# Patient Record
Sex: FEMALE | Race: WHITE | Employment: OTHER | URBAN - METROPOLITAN AREA
[De-identification: names, ages, dates, MRNs, and addresses within clinical notes are randomized per-mention and may not be internally consistent; named-entity substitution may affect disease eponyms.]

---

## 2022-05-08 ENCOUNTER — HOSPITAL ENCOUNTER (EMERGENCY)
Age: 72
Discharge: HOME | End: 2022-05-08
Payer: MEDICARE

## 2022-05-08 ENCOUNTER — APPOINTMENT (OUTPATIENT)
Dept: GENERAL RADIOLOGY | Age: 72
End: 2022-05-08
Payer: MEDICARE

## 2022-05-08 ENCOUNTER — APPOINTMENT (OUTPATIENT)
Dept: CT IMAGING | Age: 72
End: 2022-05-08
Payer: MEDICARE

## 2022-05-08 VITALS
RESPIRATION RATE: 14 BRPM | DIASTOLIC BLOOD PRESSURE: 84 MMHG | HEART RATE: 76 BPM | WEIGHT: 160 LBS | BODY MASS INDEX: 31.41 KG/M2 | HEIGHT: 60 IN | SYSTOLIC BLOOD PRESSURE: 158 MMHG | TEMPERATURE: 97 F | OXYGEN SATURATION: 96 %

## 2022-05-08 DIAGNOSIS — S02.2XXA CLOSED FRACTURE OF NASAL BONE, INITIAL ENCOUNTER: Primary | ICD-10-CM

## 2022-05-08 DIAGNOSIS — S63.502A SPRAIN OF LEFT WRIST, INITIAL ENCOUNTER: ICD-10-CM
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
## 2022-05-08 DIAGNOSIS — S09.90XA INJURY OF HEAD, INITIAL ENCOUNTER: ICD-10-CM

## 2022-05-08 PROCEDURE — 6370000000 HC RX 637 (ALT 250 FOR IP)

## 2022-05-08 PROCEDURE — 90471 IMMUNIZATION ADMIN: CPT

## 2022-05-08 PROCEDURE — 70450 CT HEAD/BRAIN W/O DYE: CPT

## 2022-05-08 PROCEDURE — 70486 CT MAXILLOFACIAL W/O DYE: CPT

## 2022-05-08 PROCEDURE — 72125 CT NECK SPINE W/O DYE: CPT

## 2022-05-08 PROCEDURE — 6360000002 HC RX W HCPCS

## 2022-05-08 PROCEDURE — 73130 X-RAY EXAM OF HAND: CPT

## 2022-05-08 PROCEDURE — 99284 EMERGENCY DEPT VISIT MOD MDM: CPT

## 2022-05-08 PROCEDURE — 90714 TD VACC NO PRESV 7 YRS+ IM: CPT

## 2022-05-08 PROCEDURE — 73110 X-RAY EXAM OF WRIST: CPT

## 2022-05-08 RX ORDER — TETANUS AND DIPHTHERIA TOXOIDS ADSORBED 2; 2 [LF]/.5ML; [LF]/.5ML
0.5 INJECTION INTRAMUSCULAR ONCE
Status: COMPLETED | OUTPATIENT
Start: 2022-05-08 | End: 2022-05-08

## 2022-05-08 RX ORDER — ACETAMINOPHEN 500 MG
1000 TABLET ORAL ONCE
Status: COMPLETED | OUTPATIENT
Start: 2022-05-08 | End: 2022-05-08

## 2022-05-08 RX ADMIN — ACETAMINOPHEN 1000 MG: 500 TABLET ORAL at 11:21

## 2022-05-08 RX ADMIN — TETANUS AND DIPHTHERIA TOXOIDS ADSORBED 0.5 ML: 2; 2 INJECTION INTRAMUSCULAR at 11:30

## 2022-05-08 ASSESSMENT — VISUAL ACUITY: OU: 1

## 2022-05-08 ASSESSMENT — PAIN - FUNCTIONAL ASSESSMENT: PAIN_FUNCTIONAL_ASSESSMENT: 0-10

## 2022-05-08 ASSESSMENT — PAIN DESCRIPTION - DESCRIPTORS
DESCRIPTORS: DISCOMFORT
DESCRIPTORS: DISCOMFORT;SHARP

## 2022-05-08 ASSESSMENT — PAIN DESCRIPTION - LOCATION
LOCATION: HEAD
LOCATION: FACE

## 2022-05-08 ASSESSMENT — PAIN SCALES - GENERAL
PAINLEVEL_OUTOF10: 8
PAINLEVEL_OUTOF10: 7

## 2022-05-08 ASSESSMENT — PAIN DESCRIPTION - ORIENTATION: ORIENTATION: POSTERIOR
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
## 2023-05-17 DIAGNOSIS — I10 ESSENTIAL (PRIMARY) HYPERTENSION: ICD-10-CM

## 2023-05-18 RX ORDER — AMLODIPINE BESYLATE 2.5 MG/1
TABLET ORAL
Qty: 90 TABLET | Refills: 1 | Status: SHIPPED | OUTPATIENT
Start: 2023-05-18 | End: 2023-10-17 | Stop reason: ALTCHOICE

## 2023-08-29 PROBLEM — M35.00 SJOGRENS SYNDROME (MULTI): Status: ACTIVE | Noted: 2023-08-29

## 2023-08-29 PROBLEM — I10 ESSENTIAL HYPERTENSION: Status: ACTIVE | Noted: 2023-08-29

## 2023-08-29 PROBLEM — R20.0 FACIAL NUMBNESS: Status: ACTIVE | Noted: 2023-08-29

## 2023-08-29 PROBLEM — S02.2XXA NASAL FRACTURE: Status: ACTIVE | Noted: 2023-08-29

## 2023-08-29 PROBLEM — E78.49 OTHER HYPERLIPIDEMIA: Status: ACTIVE | Noted: 2023-08-29

## 2023-08-29 PROBLEM — F41.8 DEPRESSION WITH ANXIETY: Status: ACTIVE | Noted: 2023-08-29

## 2023-08-29 PROBLEM — S09.92XA NASAL TRAUMA: Status: ACTIVE | Noted: 2023-08-29

## 2023-08-29 PROBLEM — K59.09 CHRONIC CONSTIPATION: Status: ACTIVE | Noted: 2023-08-29

## 2023-08-29 PROBLEM — M85.80 OSTEOPENIA: Status: ACTIVE | Noted: 2023-08-29

## 2023-08-29 PROBLEM — J34.2 NASAL SEPTAL DEVIATION: Status: ACTIVE | Noted: 2023-08-29

## 2023-08-29 PROBLEM — E03.9 HYPOTHYROIDISM: Status: ACTIVE | Noted: 2023-08-29

## 2023-08-29 PROBLEM — E06.3 HASHIMOTO'S THYROIDITIS: Status: ACTIVE | Noted: 2023-08-29

## 2023-08-29 PROBLEM — E55.9 VITAMIN D DEFICIENCY: Status: ACTIVE | Noted: 2023-08-29

## 2023-08-29 RX ORDER — IBUPROFEN 200 MG
CAPSULE ORAL
COMMUNITY

## 2023-08-29 RX ORDER — CHOLECALCIFEROL (VITAMIN D3) 25 MCG
1 TABLET ORAL DAILY
COMMUNITY
Start: 2022-02-24 | End: 2023-10-17 | Stop reason: ALTCHOICE

## 2023-08-29 RX ORDER — LEVOTHYROXINE SODIUM 88 UG/1
1 TABLET ORAL
COMMUNITY
End: 2023-10-06 | Stop reason: SDUPTHER

## 2023-08-29 RX ORDER — TRAMADOL HYDROCHLORIDE 50 MG/1
2 TABLET ORAL EVERY 6 HOURS
COMMUNITY
Start: 2022-05-16 | End: 2023-10-17 | Stop reason: ALTCHOICE

## 2023-08-29 RX ORDER — CALCIUM CARBONATE/VITAMIN D3 500 MG-10
TABLET,CHEWABLE ORAL
COMMUNITY
End: 2023-10-17 | Stop reason: ALTCHOICE

## 2023-08-29 RX ORDER — SODIUM CHLORIDE 0.65 %
2 AEROSOL, SPRAY (ML) NASAL
COMMUNITY
Start: 2022-05-16

## 2023-08-29 RX ORDER — ERGOCALCIFEROL 1.25 MG/1
1 CAPSULE ORAL
COMMUNITY
Start: 2022-04-25 | End: 2023-10-17 | Stop reason: ALTCHOICE

## 2023-10-05 ENCOUNTER — LAB (OUTPATIENT)
Dept: LAB | Facility: LAB | Age: 73
End: 2023-10-05
Payer: MEDICARE

## 2023-10-05 ENCOUNTER — OFFICE VISIT (OUTPATIENT)
Dept: ENDOCRINOLOGY | Facility: CLINIC | Age: 73
End: 2023-10-05
Payer: MEDICARE

## 2023-10-05 VITALS
DIASTOLIC BLOOD PRESSURE: 96 MMHG | RESPIRATION RATE: 16 BRPM | HEART RATE: 92 BPM | SYSTOLIC BLOOD PRESSURE: 164 MMHG | WEIGHT: 166.2 LBS | HEIGHT: 60 IN | BODY MASS INDEX: 32.63 KG/M2 | TEMPERATURE: 97.9 F

## 2023-10-05 DIAGNOSIS — E03.8 HYPOTHYROIDISM DUE TO HASHIMOTO'S THYROIDITIS: ICD-10-CM

## 2023-10-05 DIAGNOSIS — E03.8 HYPOTHYROIDISM DUE TO HASHIMOTO'S THYROIDITIS: Primary | ICD-10-CM

## 2023-10-05 DIAGNOSIS — M85.859 OSTEOPENIA OF HIP, UNSPECIFIED LATERALITY: ICD-10-CM

## 2023-10-05 DIAGNOSIS — E06.3 HYPOTHYROIDISM DUE TO HASHIMOTO'S THYROIDITIS: Primary | ICD-10-CM

## 2023-10-05 DIAGNOSIS — E55.9 VITAMIN D DEFICIENCY: ICD-10-CM

## 2023-10-05 DIAGNOSIS — E06.3 HYPOTHYROIDISM DUE TO HASHIMOTO'S THYROIDITIS: ICD-10-CM

## 2023-10-05 LAB
25(OH)D3 SERPL-MCNC: 19 NG/ML (ref 30–100)
ALBUMIN SERPL BCP-MCNC: 4.3 G/DL (ref 3.4–5)
ALP SERPL-CCNC: 68 U/L (ref 33–136)
ALT SERPL W P-5'-P-CCNC: 12 U/L (ref 7–45)
ANION GAP SERPL CALC-SCNC: 19 MMOL/L (ref 10–20)
AST SERPL W P-5'-P-CCNC: 15 U/L (ref 9–39)
BILIRUB SERPL-MCNC: 0.8 MG/DL (ref 0–1.2)
BUN SERPL-MCNC: 9 MG/DL (ref 6–23)
CALCIUM SERPL-MCNC: 9.6 MG/DL (ref 8.6–10.6)
CHLORIDE SERPL-SCNC: 107 MMOL/L (ref 98–107)
CO2 SERPL-SCNC: 20 MMOL/L (ref 21–32)
CREAT SERPL-MCNC: 0.74 MG/DL (ref 0.5–1.05)
GFR SERPL CREATININE-BSD FRML MDRD: 87 ML/MIN/1.73M*2
GLUCOSE SERPL-MCNC: 96 MG/DL (ref 74–99)
POTASSIUM SERPL-SCNC: 3.9 MMOL/L (ref 3.5–5.3)
PROT SERPL-MCNC: 7 G/DL (ref 6.4–8.2)
SODIUM SERPL-SCNC: 142 MMOL/L (ref 136–145)
TSH SERPL-ACNC: 0.54 MIU/L (ref 0.44–3.98)

## 2023-10-05 PROCEDURE — 99213 OFFICE O/P EST LOW 20 MIN: CPT | Performed by: INTERNAL MEDICINE

## 2023-10-05 PROCEDURE — 83970 ASSAY OF PARATHORMONE: CPT

## 2023-10-05 PROCEDURE — 1126F AMNT PAIN NOTED NONE PRSNT: CPT | Performed by: INTERNAL MEDICINE

## 2023-10-05 PROCEDURE — 3077F SYST BP >= 140 MM HG: CPT | Performed by: INTERNAL MEDICINE

## 2023-10-05 PROCEDURE — 1159F MED LIST DOCD IN RCRD: CPT | Performed by: INTERNAL MEDICINE

## 2023-10-05 PROCEDURE — 3080F DIAST BP >= 90 MM HG: CPT | Performed by: INTERNAL MEDICINE

## 2023-10-05 PROCEDURE — 82306 VITAMIN D 25 HYDROXY: CPT

## 2023-10-05 PROCEDURE — 80053 COMPREHEN METABOLIC PANEL: CPT

## 2023-10-05 PROCEDURE — 84443 ASSAY THYROID STIM HORMONE: CPT

## 2023-10-05 PROCEDURE — 36415 COLL VENOUS BLD VENIPUNCTURE: CPT

## 2023-10-05 NOTE — PROGRESS NOTES
Patient Discussion/Summary     we will call you with labs.      Provider Impressions     72 yo CF with hypothyroidism is here for follow up she also ahs vitamin D def   1- hypothyroidism: we will check TFT to adjust LT4, history of radiation to head and neck, we ordered USG which did not show any nodule.on lt4 88 mcg q day TSh at goal no sign og hypothyroidism, will check albs again she had a  fracture this summer , Dexa scan by rheumatology  and was osteopenia in spine and hip and osteoporosis in forearm seeing rheumatology , no acute issue son Lt4 88 mcg q day   2-Sec hyperparathyroidism due to vitamin D def :on vitamin D. also had History of hyperparathyroidism in the past ,we will recheck PTH ,calcium and vitamin D ordered      Chief Complaint     Patient is here today for a follow up visit regarding her Hypothyroidism.      History of Present IllnessLyssa Carson is here for routine follow up. She is currently taking Synthroid 88 mcg daily.    seeing Pcp for Htn,  seeing PCP , no symptoms, did not take her meds.     She needs to take the brand, has been taking it for 20 years.      Denies palpitations, tremors, constipation or diarrhea.   SHe is more stressed and fatigued lately because she has been taking care of her 90 year old mother , she feels more depressed is referred by PCP to see psychology, no SI/HI  recent TFT showed TSH 0.05 , elevated free T4.  . on vitamin D weekly , due for DXA scan by rheumatology   no fatigue, some constipation, no palpitation, no abdominal pain,   last visit in 2020, Repeat /78 HR 90 she stated she was stressed out no CP, no SOB, will see her PCP for further evaluation TSH was normal in Dec clinically euthyroid, no goiter on exam.      Review of Systems     Constitutional: no fever, no chills, normal appetite, no recent weight gain, no recent weight loss and not feeling tired (fatigue).   Endocrine:.   no hot flashes.    no night sweats.    no intolerance to heat.   Eyes:  no eye pain, no double vision and no change in vision.   ENT: no hearing loss, no dental problems, no sore throat, no change in voice and as noted in HPI.   Cardiovascular: no chest pain, no palpitations, no intermittent leg claudication and no lower extremity edema.   Respiratory: no shortness of breath, no wheezing and no cough.   Gastrointestinal: no abdominal pain, no constipation, no nausea, no diarrhea and no vomiting.   Genitourinary: no dysuria, no incontinence and no urinary hesitancy.   Genitourinary: no dysuria, no incontinence and no urinary hesitancy.   Musculoskeletal: no arthralgias, no myalgias, no muscle cramps, no joint swelling, no joint stiffness and no limb pain.   Integumentary: no change in skin color, no skin lesions, no itching, no excessive sweating and no skin wound.   Neurological: no confusion, no convulsions, no dizziness, no fainting, no tremors, no foot pain, no limb weakness and no difficulty walking.   Psychiatric: no anxiety, no depression, no personality change, no emotional problems, no suicidal ideation, no sleep disturbances and no panic attacks.   Hematologic/Lymphatic: no swollen glands in the neck, no tendency for easy bruising and no tendency for easy bleeding.   All other systems have been reviewed and are negative for complaint.      Active Problems  Problems    · Depression with anxiety (300.4) (F41.8)   · Encounter for mammogram to establish baseline mammogram (V76.12) (Z12.31)   · Hashimoto's thyroiditis (245.2) (E06.3)   · Hypothyroidism (244.9) (E03.9)   · Other hyperlipidemia (272.4) (E78.49)   · Sjogrens syndrome (710.2) (M35.00)   · Vitamin D deficiency (268.9) (E55.9)     Past Medical History  Problems    · History of hypothyroidism (V12.29) (Z86.39)   · History of Primary Hyperparathyroidism (252.01)     Surgical History  Problems    · History of Parathyroid Resection Sub-Total Parathyroidectomy     Family History  Brother    · Family history of hypothyroidism  (V18.19) (Z83.49)  Family History    · Family history of Benign Essential Hypertension     Social History  Problems    · Lives independently with spouse   · Never smoker   · No alcohol use     Allergies  Medication    · Ciprofloxacin HCl TABS   Unknown; Recorded By: Yoselin Munoz; 1/3/2014 12:42:21 PM  NonMedication    · Dust   Recorded By: Yoselin Munoz; 1/3/2014 12:41:30 PM     Current Meds     Medication Name Instruction   Levothyroxine Sodium 88 MCG Oral Tablet TAKE 1 TABLET BY MOUTH IN THE MORNING on an empty stomach.   Vitamin D3 25 MCG (1000 UT) Oral Tablet TAKE 1 TABLET DAILY.           Physical Exam     Constitutional   General appearance: Alert and in no acute distress.   Eyes   Conjunctiva and lids: Normal. Pupils were equal in size, round, reactive to light (PERRL) with normal accommodation and extraocular movements intact (EOMI).   Neck   Neck: No neck mass was observed. Supple.    Thyroid: Not enlarged and there were no palpable thyroid nodules.   Pulmonary   Respiratory effort: No respiratory distress.    Percussion of chest: Normal.     Palpation of chest: Normal.    Auscultation of lungs: Clear bilateral breath sounds.   Cardiovascular   Palpation of heart: Normal.     Auscultation of heart: Heart rate and rhythm were normal. Normal S1 and S2, no gallops, no murmurs and no pericardial rub.    Carotid pulses: Normal with no bruits.    Abdominal aorta: Normal.    Femoral pulses: Normal.     Pedal pulses: Normal.    Peripheral vascular exam: Normal.     Examination of extremities for edema and/or varicosities: No peripheral edema.   Chest   Breasts: Normal appearance; no nipple discharge.    Palpation of breasts and axillae: No palpable mass and no axillary lymphadenopathy.   Examination of supraclavicular and dorsocervical areas: Normal. There were no supraclavicular or dorsocervical fat pads.   Abdomen   Abdomen: Normal bowel sounds, soft nontender; no abdominal mass palpated.    Liver and  spleen: No hepatosplenomegaly.    Examination for hernias: No hernias.    Stool sample for occult blood: Normal.    Musculoskeletal   Muscle strength/tone: Normal. No proximal muscle weakness was detected.     Gait and station: Normal.    Digits and nails: No clubbing or cyanosis of the fingernails.    Inspection/palpation of joints, bones, and muscles: No joint swelling seen, normal movements of all extremities.    Range of motion: Normal.     Stability: Normal.    Neurologic -   Cranial Nerve Exam: Normal.    Reflexes: Deep tendon reflexes were normal and without delay in the return phase.

## 2023-10-06 ENCOUNTER — TELEPHONE (OUTPATIENT)
Dept: INTERNAL MEDICINE | Facility: HOSPITAL | Age: 73
End: 2023-10-06
Payer: MEDICARE

## 2023-10-06 DIAGNOSIS — E03.8 HYPOTHYROIDISM DUE TO HASHIMOTO'S THYROIDITIS: Primary | ICD-10-CM

## 2023-10-06 DIAGNOSIS — E06.3 HYPOTHYROIDISM DUE TO HASHIMOTO'S THYROIDITIS: Primary | ICD-10-CM

## 2023-10-06 LAB — PTH-INTACT SERPL-MCNC: 79.1 PG/ML (ref 18.5–88)

## 2023-10-06 RX ORDER — LEVOTHYROXINE SODIUM 88 UG/1
88 TABLET ORAL
Qty: 90 TABLET | Refills: 2 | Status: SHIPPED | OUTPATIENT
Start: 2023-10-06 | End: 2024-04-09 | Stop reason: SDUPTHER

## 2023-10-07 ENCOUNTER — LAB (OUTPATIENT)
Dept: LAB | Facility: LAB | Age: 73
End: 2023-10-07
Payer: MEDICARE

## 2023-10-07 DIAGNOSIS — E06.3 HYPOTHYROIDISM DUE TO HASHIMOTO'S THYROIDITIS: ICD-10-CM

## 2023-10-07 DIAGNOSIS — E03.8 HYPOTHYROIDISM DUE TO HASHIMOTO'S THYROIDITIS: ICD-10-CM

## 2023-10-07 LAB
ANION GAP SERPL CALC-SCNC: 12 MMOL/L (ref 10–20)
BUN SERPL-MCNC: 7 MG/DL (ref 6–23)
CALCIUM SERPL-MCNC: 9.7 MG/DL (ref 8.6–10.6)
CHLORIDE SERPL-SCNC: 107 MMOL/L (ref 98–107)
CO2 SERPL-SCNC: 26 MMOL/L (ref 21–32)
CREAT SERPL-MCNC: 0.77 MG/DL (ref 0.5–1.05)
GFR SERPL CREATININE-BSD FRML MDRD: 83 ML/MIN/1.73M*2
GLUCOSE SERPL-MCNC: 102 MG/DL (ref 74–99)
POTASSIUM SERPL-SCNC: 3.8 MMOL/L (ref 3.5–5.3)
SODIUM SERPL-SCNC: 141 MMOL/L (ref 136–145)

## 2023-10-07 PROCEDURE — 36415 COLL VENOUS BLD VENIPUNCTURE: CPT

## 2023-10-07 PROCEDURE — 80048 BASIC METABOLIC PNL TOTAL CA: CPT

## 2023-10-17 ENCOUNTER — OFFICE VISIT (OUTPATIENT)
Dept: PRIMARY CARE | Facility: CLINIC | Age: 73
End: 2023-10-17
Payer: MEDICARE

## 2023-10-17 VITALS
BODY MASS INDEX: 32.39 KG/M2 | SYSTOLIC BLOOD PRESSURE: 154 MMHG | DIASTOLIC BLOOD PRESSURE: 88 MMHG | HEIGHT: 60 IN | WEIGHT: 165 LBS | TEMPERATURE: 97.1 F

## 2023-10-17 DIAGNOSIS — E55.9 VITAMIN D DEFICIENCY: ICD-10-CM

## 2023-10-17 DIAGNOSIS — Z12.31 ENCOUNTER FOR SCREENING MAMMOGRAM FOR MALIGNANT NEOPLASM OF BREAST: ICD-10-CM

## 2023-10-17 DIAGNOSIS — E03.8 OTHER SPECIFIED HYPOTHYROIDISM: ICD-10-CM

## 2023-10-17 DIAGNOSIS — Z00.00 MEDICARE ANNUAL WELLNESS VISIT, SUBSEQUENT: Primary | ICD-10-CM

## 2023-10-17 DIAGNOSIS — I10 PRIMARY HYPERTENSION: ICD-10-CM

## 2023-10-17 DIAGNOSIS — M35.05 SJOGREN'S SYNDROME WITH INFLAMMATORY ARTHRITIS (MULTI): ICD-10-CM

## 2023-10-17 DIAGNOSIS — Z23 PNEUMOCOCCAL VACCINATION ADMINISTERED AT CURRENT VISIT: ICD-10-CM

## 2023-10-17 PROCEDURE — 1126F AMNT PAIN NOTED NONE PRSNT: CPT | Performed by: INTERNAL MEDICINE

## 2023-10-17 PROCEDURE — 99214 OFFICE O/P EST MOD 30 MIN: CPT | Performed by: INTERNAL MEDICINE

## 2023-10-17 PROCEDURE — 1170F FXNL STATUS ASSESSED: CPT | Performed by: INTERNAL MEDICINE

## 2023-10-17 PROCEDURE — G0009 ADMIN PNEUMOCOCCAL VACCINE: HCPCS | Performed by: INTERNAL MEDICINE

## 2023-10-17 PROCEDURE — 1036F TOBACCO NON-USER: CPT | Performed by: INTERNAL MEDICINE

## 2023-10-17 PROCEDURE — 3077F SYST BP >= 140 MM HG: CPT | Performed by: INTERNAL MEDICINE

## 2023-10-17 PROCEDURE — G0439 PPPS, SUBSEQ VISIT: HCPCS | Performed by: INTERNAL MEDICINE

## 2023-10-17 PROCEDURE — 3079F DIAST BP 80-89 MM HG: CPT | Performed by: INTERNAL MEDICINE

## 2023-10-17 PROCEDURE — 1159F MED LIST DOCD IN RCRD: CPT | Performed by: INTERNAL MEDICINE

## 2023-10-17 PROCEDURE — 1160F RVW MEDS BY RX/DR IN RCRD: CPT | Performed by: INTERNAL MEDICINE

## 2023-10-17 PROCEDURE — 90677 PCV20 VACCINE IM: CPT | Performed by: INTERNAL MEDICINE

## 2023-10-17 RX ORDER — AMLODIPINE BESYLATE 5 MG/1
5 TABLET ORAL DAILY
Qty: 90 TABLET | Refills: 1 | Status: SHIPPED | OUTPATIENT
Start: 2023-10-17 | End: 2024-04-12

## 2023-10-17 ASSESSMENT — ENCOUNTER SYMPTOMS
FLANK PAIN: 0
UNEXPECTED WEIGHT CHANGE: 0
LIGHT-HEADEDNESS: 0
APPETITE CHANGE: 0
CHILLS: 0
DYSURIA: 0
ABDOMINAL PAIN: 0
OCCASIONAL FEELINGS OF UNSTEADINESS: 0
NERVOUS/ANXIOUS: 0
SHORTNESS OF BREATH: 0
SLEEP DISTURBANCE: 0
WHEEZING: 0
NECK PAIN: 0
CONSTIPATION: 1
DECREASED CONCENTRATION: 0
WEAKNESS: 0
COUGH: 0
DEPRESSION: 0
BACK PAIN: 0
LOSS OF SENSATION IN FEET: 0
BLOOD IN STOOL: 0
FREQUENCY: 0
HEADACHES: 0
ARTHRALGIAS: 0
ACTIVITY CHANGE: 0
SORE THROAT: 0
DIFFICULTY URINATING: 0
DIZZINESS: 0
PALPITATIONS: 0
CHEST TIGHTNESS: 0

## 2023-10-17 ASSESSMENT — ACTIVITIES OF DAILY LIVING (ADL)
DOING_HOUSEWORK: INDEPENDENT
DRESSING: INDEPENDENT
MANAGING_FINANCES: INDEPENDENT
TAKING_MEDICATION: INDEPENDENT
GROCERY_SHOPPING: INDEPENDENT
BATHING: INDEPENDENT

## 2023-10-17 ASSESSMENT — PATIENT HEALTH QUESTIONNAIRE - PHQ9
6. FEELING BAD ABOUT YOURSELF - OR THAT YOU ARE A FAILURE OR HAVE LET YOURSELF OR YOUR FAMILY DOWN: NOT AT ALL
1. LITTLE INTEREST OR PLEASURE IN DOING THINGS: SEVERAL DAYS
2. FEELING DOWN, DEPRESSED OR HOPELESS: SEVERAL DAYS
SUM OF ALL RESPONSES TO PHQ9 QUESTIONS 1 AND 2: 2
10. IF YOU CHECKED OFF ANY PROBLEMS, HOW DIFFICULT HAVE THESE PROBLEMS MADE IT FOR YOU TO DO YOUR WORK, TAKE CARE OF THINGS AT HOME, OR GET ALONG WITH OTHER PEOPLE: NOT DIFFICULT AT ALL
5. POOR APPETITE OR OVEREATING: NOT AT ALL

## 2023-10-17 NOTE — PROGRESS NOTES
Subjective   Patient ID: Lyssa Carson is a 73 y.o. female.    HPI Ms. Christine is seen today for Medicare wellness visit and follow-up.  Her medical history significant for hypertension, thyroidism, hyperlipidemia.  She feels well.  She has fallen few months ago.  She takes care of her mother who lives with her.  She exercises occasionally and tries to eat a healthy diet.    Review of Systems   Constitutional:  Negative for activity change, appetite change, chills and unexpected weight change.   HENT:  Negative for congestion, postnasal drip and sore throat.    Eyes:  Negative for visual disturbance.   Respiratory:  Negative for cough, chest tightness, shortness of breath and wheezing.    Cardiovascular:  Negative for chest pain, palpitations and leg swelling.   Gastrointestinal:  Positive for constipation. Negative for abdominal pain and blood in stool.   Endocrine: Negative for cold intolerance and heat intolerance.   Genitourinary:  Negative for difficulty urinating, dysuria, flank pain and frequency.   Musculoskeletal:  Negative for arthralgias, back pain, gait problem and neck pain.   Skin:  Negative for rash.   Allergic/Immunologic: Negative for environmental allergies and food allergies.   Neurological:  Negative for dizziness, weakness, light-headedness and headaches.   Psychiatric/Behavioral:  Negative for decreased concentration and sleep disturbance. The patient is not nervous/anxious.        Objective   Physical Exam  Vitals reviewed.   Constitutional:       General: She is not in acute distress.     Appearance: Normal appearance.   HENT:      Head: Normocephalic and atraumatic.   Cardiovascular:      Rate and Rhythm: Normal rate and regular rhythm.      Pulses: Normal pulses.   Pulmonary:      Effort: Pulmonary effort is normal. No respiratory distress.      Breath sounds: Normal breath sounds.   Abdominal:      General: Bowel sounds are normal. There is no distension.      Tenderness: There is no abdominal  tenderness.   Musculoskeletal:         General: No swelling or tenderness. Normal range of motion.      Cervical back: Normal range of motion.   Skin:     General: Skin is warm.   Neurological:      General: No focal deficit present.      Mental Status: She is alert.      Coordination: Coordination normal.      Gait: Gait normal.   Psychiatric:         Mood and Affect: Mood normal.         Behavior: Behavior normal.         Assessment/Plan   Diagnoses and all orders for this visit:  Medicare annual wellness visit, subsequent  Comments:  Wellness visit completed  1 year vaccine administered  Mammogram ordered  Vitamin D deficiency  Sjogren's syndrome with inflammatory arthritis (CMS/[facility])  Comments:  Stable  Other specified hypothyroidism  Comments:  Continue with levothyroxine  Encounter for screening mammogram for malignant neoplasm of breast  Comments:  Mammogram ordered  Orders:  -     BI mammo bilateral screening tomosynthesis; Future  Pneumococcal vaccination administered at current visit  Comments:  Prevnar 20 vaccine administered  Orders:  -     Pneumococcal conjugate vaccine, 20-valent (PREVNAR 20)  Primary hypertension  Comments:  Blood pressure is elevated  Increase amlodipine to 5 mg daily  Monitor, follow-up in 3 to 4 months  Orders:  -     amLODIPine (Norvasc) 5 mg tablet; Take 1 tablet (5 mg) by mouth once daily.

## 2023-11-03 ENCOUNTER — APPOINTMENT (OUTPATIENT)
Dept: RADIOLOGY | Facility: CLINIC | Age: 73
End: 2023-11-03
Payer: MEDICARE

## 2023-11-13 ENCOUNTER — APPOINTMENT (OUTPATIENT)
Dept: RADIOLOGY | Facility: CLINIC | Age: 73
End: 2023-11-13
Payer: MEDICARE

## 2023-12-05 ENCOUNTER — APPOINTMENT (OUTPATIENT)
Dept: RADIOLOGY | Facility: CLINIC | Age: 73
End: 2023-12-05
Payer: MEDICARE

## 2024-01-23 ENCOUNTER — APPOINTMENT (OUTPATIENT)
Dept: PRIMARY CARE | Facility: CLINIC | Age: 74
End: 2024-01-23
Payer: MEDICARE

## 2024-02-13 ENCOUNTER — HOSPITAL ENCOUNTER (OUTPATIENT)
Dept: RADIOLOGY | Facility: CLINIC | Age: 74
Discharge: HOME | End: 2024-02-13
Payer: MEDICARE

## 2024-02-13 DIAGNOSIS — Z12.31 ENCOUNTER FOR SCREENING MAMMOGRAM FOR MALIGNANT NEOPLASM OF BREAST: ICD-10-CM

## 2024-02-13 PROCEDURE — 77067 SCR MAMMO BI INCL CAD: CPT

## 2024-02-13 PROCEDURE — 77063 BREAST TOMOSYNTHESIS BI: CPT | Performed by: RADIOLOGY

## 2024-02-13 PROCEDURE — 77067 SCR MAMMO BI INCL CAD: CPT | Performed by: RADIOLOGY

## 2024-04-09 ENCOUNTER — OFFICE VISIT (OUTPATIENT)
Dept: ENDOCRINOLOGY | Facility: CLINIC | Age: 74
End: 2024-04-09
Payer: MEDICARE

## 2024-04-09 ENCOUNTER — LAB (OUTPATIENT)
Dept: LAB | Facility: LAB | Age: 74
End: 2024-04-09
Payer: MEDICARE

## 2024-04-09 VITALS
BODY MASS INDEX: 32.39 KG/M2 | SYSTOLIC BLOOD PRESSURE: 147 MMHG | TEMPERATURE: 98.5 F | DIASTOLIC BLOOD PRESSURE: 84 MMHG | WEIGHT: 165 LBS | HEIGHT: 60 IN | HEART RATE: 105 BPM

## 2024-04-09 DIAGNOSIS — E03.8 HYPOTHYROIDISM DUE TO HASHIMOTO'S THYROIDITIS: ICD-10-CM

## 2024-04-09 DIAGNOSIS — E55.9 VITAMIN D DEFICIENCY: ICD-10-CM

## 2024-04-09 DIAGNOSIS — K90.9 INTESTINAL MALABSORPTION, UNSPECIFIED TYPE (HHS-HCC): ICD-10-CM

## 2024-04-09 DIAGNOSIS — E06.3 HYPOTHYROIDISM DUE TO HASHIMOTO'S THYROIDITIS: ICD-10-CM

## 2024-04-09 DIAGNOSIS — K90.9 INTESTINAL MALABSORPTION, UNSPECIFIED TYPE (HHS-HCC): Primary | ICD-10-CM

## 2024-04-09 LAB
25(OH)D3 SERPL-MCNC: 7 NG/ML (ref 30–100)
PTH-INTACT SERPL-MCNC: 74 PG/ML (ref 18.5–88)
T4 FREE SERPL-MCNC: 1.45 NG/DL (ref 0.78–1.48)
TSH SERPL-ACNC: 1.48 MIU/L (ref 0.44–3.98)
TTG IGA SER IA-ACNC: <1 U/ML

## 2024-04-09 PROCEDURE — 84439 ASSAY OF FREE THYROXINE: CPT

## 2024-04-09 PROCEDURE — 36415 COLL VENOUS BLD VENIPUNCTURE: CPT

## 2024-04-09 PROCEDURE — 1159F MED LIST DOCD IN RCRD: CPT | Performed by: STUDENT IN AN ORGANIZED HEALTH CARE EDUCATION/TRAINING PROGRAM

## 2024-04-09 PROCEDURE — 83516 IMMUNOASSAY NONANTIBODY: CPT

## 2024-04-09 PROCEDURE — 3079F DIAST BP 80-89 MM HG: CPT | Performed by: STUDENT IN AN ORGANIZED HEALTH CARE EDUCATION/TRAINING PROGRAM

## 2024-04-09 PROCEDURE — 82306 VITAMIN D 25 HYDROXY: CPT

## 2024-04-09 PROCEDURE — 83970 ASSAY OF PARATHORMONE: CPT

## 2024-04-09 PROCEDURE — 1036F TOBACCO NON-USER: CPT | Performed by: STUDENT IN AN ORGANIZED HEALTH CARE EDUCATION/TRAINING PROGRAM

## 2024-04-09 PROCEDURE — 84078 ASSAY ALKALINE PHOSPHATASE: CPT

## 2024-04-09 PROCEDURE — 99213 OFFICE O/P EST LOW 20 MIN: CPT | Performed by: STUDENT IN AN ORGANIZED HEALTH CARE EDUCATION/TRAINING PROGRAM

## 2024-04-09 PROCEDURE — 3077F SYST BP >= 140 MM HG: CPT | Performed by: STUDENT IN AN ORGANIZED HEALTH CARE EDUCATION/TRAINING PROGRAM

## 2024-04-09 PROCEDURE — 84443 ASSAY THYROID STIM HORMONE: CPT

## 2024-04-09 RX ORDER — LEVOTHYROXINE SODIUM 88 UG/1
88 TABLET ORAL
Qty: 90 TABLET | Refills: 2 | Status: SHIPPED | OUTPATIENT
Start: 2024-04-09

## 2024-04-09 NOTE — PATIENT INSTRUCTIONS
Vitamin D 5 000 units daily   Calcium 1000mg daily   Continue synthroid 88mcg   Follow up with Rheumatology     Labs anytime    Follow up 6 months    Radha Pablo MD  Divison of Endocrinology   [facility]   Phone: [phone]    option 4, then option 1  Fax: [phone]

## 2024-04-09 NOTE — PROGRESS NOTES
73 F PMH: Sjogren, HTN    Following up for thyroid, previously seen Dr. Quintanilla     Hypothyroidism presumably from history of head neck radiation during childhood.  On levothyroxine replacement for more than 15 years     Currently on synthroid 88mcg daily     Last ultrasound was in 2014 and did not show any nodules     2) history of parathyroidectomy >15 years ago with no evidence of recurrence   Currently as secondary hyperpara from vitamin D deficiency     Not consistent with vitamin D intake due to GI issues, having alternating constipation and diarrhea     Last DEXA scan was in 2023 that showed osteopenia with FRAX of 2.7% fracutre of the hip and 12.4% major.    Has scheduled appt with Rheumatology      Past Medical History:   Diagnosis Date    Arthritis     Asymptomatic menopausal state 03/28/2022    Postmenopausal state    Hypertension     Other conditions influencing health status     Primary Hyperparathyroidism    Personal history of (healed) traumatic fracture 08/18/2022    History of fracture of nasal bone    Personal history of other diseases of the respiratory system 08/18/2022    History of deviated nasal septum    Personal history of other endocrine, nutritional and metabolic disease     History of hypothyroidism    Visual impairment      Family History   Problem Relation Name Age of Onset    Hypothyroidism Brother      Hypertension Other Family     Arthritis Mother Edilma Ulises     COPD Father Ryder Ulises      Social History     Socioeconomic History    Marital status:      Spouse name: Not on file    Number of children: Not on file    Years of education: Not on file    Highest education level: Not on file   Occupational History    Not on file   Tobacco Use    Smoking status: Never    Smokeless tobacco: Never   Vaping Use    Vaping Use: Never used   Substance and Sexual Activity    Alcohol use: Not Currently     Comment: havent had a drink for about15 years    Drug use: Never    Sexual activity:  Not Currently     Partners: Male     Birth control/protection: None   Other Topics Concern    Not on file   Social History Narrative    Not on file     Social Determinants of Health     Financial Resource Strain: Not on file   Food Insecurity: Not on file   Transportation Needs: Not on file   Physical Activity: Not on file   Stress: Not on file   Social Connections: Not on file   Intimate Partner Violence: Not on file   Housing Stability: Not on file     ROS reviewed and is negative except for pertinent findings noted on HPI    labs and imaging reviewed, pertinent findings listed on HPI and Impression    Physical Exam  Constitutional:       Appearance: She is obese.   Neck:      Comments: No goiter, no nodule palpated  Abdominal:      Palpations: Abdomen is soft.   Musculoskeletal:         General: No swelling or tenderness.   Skin:     General: Skin is warm.   Neurological:      General: No focal deficit present.      Mental Status: She is alert and oriented to person, place, and time.   Psychiatric:         Mood and Affect: Mood normal.         Behavior: Behavior normal.         Problem List Items Addressed This Visit       Hypothyroidism    Relevant Medications    levothyroxine (Synthroid, Levoxyl) 88 mcg tablet    Other Relevant Orders    Thyroid Stimulating Hormone    Thyroxine, Free    Vitamin D deficiency    Relevant Orders    Vitamin D 25-Hydroxy,Total (for eval of Vitamin D levels)    Parathyroid Hormone, Intact    Alkaline Phosphatase, Bone Specific     Other Visit Diagnoses       Intestinal malabsorption, unspecified type    -  Primary    Relevant Orders    Tissue Transglutaminase IgA          1) continue synthroid 88mcg daily   No need to repeat ultrasound     2)   Will screen for celiac due to GI issues  Increase vitamin D 5000 units daily   Check PTH and vitamin D levels   Advised to follow up with Rheum for her Sjogrens and bone monitoring     Follow up in 6 months

## 2024-04-11 LAB — ALP BONE SERPL-MCNC: 15.4 UG/L

## 2024-04-12 DIAGNOSIS — I10 PRIMARY HYPERTENSION: ICD-10-CM

## 2024-04-12 RX ORDER — AMLODIPINE BESYLATE 5 MG/1
5 TABLET ORAL DAILY
Qty: 90 TABLET | Refills: 1 | Status: SHIPPED | OUTPATIENT
Start: 2024-04-12

## 2024-04-17 DIAGNOSIS — E55.9 VITAMIN D DEFICIENCY: Primary | ICD-10-CM

## 2024-04-17 RX ORDER — ACETAMINOPHEN 500 MG
5000 TABLET ORAL DAILY
Qty: 90 TABLET | Refills: 3 | Status: SHIPPED | OUTPATIENT
Start: 2024-04-17

## 2024-04-17 RX ORDER — ERGOCALCIFEROL 1.25 MG/1
1.25 CAPSULE ORAL
Qty: 8 CAPSULE | Refills: 0 | Status: SHIPPED | OUTPATIENT
Start: 2024-04-21 | End: 2025-04-21

## 2024-05-01 ENCOUNTER — APPOINTMENT (OUTPATIENT)
Dept: RHEUMATOLOGY | Facility: CLINIC | Age: 74
End: 2024-05-01
Payer: MEDICARE

## 2024-06-13 ENCOUNTER — APPOINTMENT (OUTPATIENT)
Dept: RHEUMATOLOGY | Facility: CLINIC | Age: 74
End: 2024-06-13
Payer: MEDICARE

## 2024-10-04 DIAGNOSIS — I10 PRIMARY HYPERTENSION: ICD-10-CM

## 2024-10-04 RX ORDER — AMLODIPINE BESYLATE 5 MG/1
5 TABLET ORAL DAILY
Qty: 30 TABLET | Refills: 0 | Status: SHIPPED | OUTPATIENT
Start: 2024-10-04

## 2024-10-10 ENCOUNTER — APPOINTMENT (OUTPATIENT)
Dept: ENDOCRINOLOGY | Facility: CLINIC | Age: 74
End: 2024-10-10
Payer: MEDICARE

## 2024-10-30 DIAGNOSIS — I10 PRIMARY HYPERTENSION: ICD-10-CM

## 2024-10-30 RX ORDER — AMLODIPINE BESYLATE 5 MG/1
5 TABLET ORAL DAILY
Qty: 30 TABLET | Refills: 0 | Status: SHIPPED | OUTPATIENT
Start: 2024-10-30

## 2024-11-25 DIAGNOSIS — I10 PRIMARY HYPERTENSION: ICD-10-CM

## 2024-11-25 RX ORDER — AMLODIPINE BESYLATE 5 MG/1
5 TABLET ORAL DAILY
Qty: 30 TABLET | Refills: 1 | Status: SHIPPED | OUTPATIENT
Start: 2024-11-25

## 2024-12-19 DIAGNOSIS — I10 PRIMARY HYPERTENSION: ICD-10-CM

## 2024-12-19 RX ORDER — AMLODIPINE BESYLATE 5 MG/1
5 TABLET ORAL DAILY
Qty: 30 TABLET | Refills: 0 | Status: SHIPPED | OUTPATIENT
Start: 2024-12-19

## 2025-01-02 DIAGNOSIS — E06.3 HYPOTHYROIDISM DUE TO HASHIMOTO'S THYROIDITIS: ICD-10-CM

## 2025-01-02 RX ORDER — LEVOTHYROXINE SODIUM 88 UG/1
88 TABLET ORAL DAILY
Qty: 90 TABLET | Refills: 0 | Status: SHIPPED | OUTPATIENT
Start: 2025-01-02

## 2025-04-01 DIAGNOSIS — E06.3 HYPOTHYROIDISM DUE TO HASHIMOTO'S THYROIDITIS: ICD-10-CM

## 2025-04-01 RX ORDER — LEVOTHYROXINE SODIUM 88 UG/1
88 TABLET ORAL DAILY
Qty: 90 TABLET | Refills: 0 | OUTPATIENT
Start: 2025-04-01

## 2025-04-03 DIAGNOSIS — E06.3 HYPOTHYROIDISM DUE TO HASHIMOTO'S THYROIDITIS: ICD-10-CM

## 2025-04-03 NOTE — TELEPHONE ENCOUNTER
Per CRM:    Patient has been scheduled for next available appointment but needs a medication refill she stated she does not have enough medication to last her to her appointment     Spoke with patient, patient would like a refill on synthroid medication. Patient has upcoming appointment in September, and also add to wait list. Provider notified.

## 2025-04-07 RX ORDER — LEVOTHYROXINE SODIUM 88 UG/1
88 TABLET ORAL DAILY
Qty: 90 TABLET | Refills: 0 | Status: SHIPPED | OUTPATIENT
Start: 2025-04-07

## 2025-05-01 ENCOUNTER — APPOINTMENT (OUTPATIENT)
Dept: PRIMARY CARE | Facility: CLINIC | Age: 75
End: 2025-05-01
Payer: MEDICARE

## 2025-05-16 ENCOUNTER — APPOINTMENT (OUTPATIENT)
Dept: PRIMARY CARE | Facility: CLINIC | Age: 75
End: 2025-05-16
Payer: MEDICARE

## 2025-06-05 ENCOUNTER — APPOINTMENT (OUTPATIENT)
Dept: PRIMARY CARE | Facility: CLINIC | Age: 75
End: 2025-06-05
Payer: MEDICARE

## 2025-06-19 ENCOUNTER — APPOINTMENT (OUTPATIENT)
Dept: PRIMARY CARE | Facility: CLINIC | Age: 75
End: 2025-06-19
Payer: MEDICARE

## 2025-07-04 DIAGNOSIS — E06.3 HYPOTHYROIDISM DUE TO HASHIMOTO'S THYROIDITIS: ICD-10-CM

## 2025-07-07 RX ORDER — LEVOTHYROXINE SODIUM 88 UG/1
88 TABLET ORAL DAILY
Qty: 90 TABLET | Refills: 0 | Status: SHIPPED | OUTPATIENT
Start: 2025-07-07

## 2025-08-21 ASSESSMENT — ENCOUNTER SYMPTOMS
DIARRHEA: 0
MYALGIAS: 1
FEVER: 0
FREQUENCY: 0
FLATUS: 0
ANOREXIA: 1
DYSURIA: 0
WEIGHT LOSS: 0
VOMITING: 0
HEMATURIA: 0
BELCHING: 0
ABDOMINAL PAIN: 1
ARTHRALGIAS: 1
HEADACHES: 0
HEMATOCHEZIA: 0
NAUSEA: 1
CONSTIPATION: 1

## 2025-08-22 ENCOUNTER — APPOINTMENT (OUTPATIENT)
Dept: RADIOLOGY | Facility: HOSPITAL | Age: 75
End: 2025-08-22
Payer: MEDICARE

## 2025-08-22 ENCOUNTER — HOSPITAL ENCOUNTER (EMERGENCY)
Facility: HOSPITAL | Age: 75
Discharge: HOME | End: 2025-08-22
Attending: EMERGENCY MEDICINE
Payer: MEDICARE

## 2025-08-22 ENCOUNTER — OFFICE VISIT (OUTPATIENT)
Dept: URGENT CARE | Age: 75
End: 2025-08-22
Payer: MEDICARE

## 2025-08-22 ENCOUNTER — HOSPITAL ENCOUNTER (OUTPATIENT)
Dept: RADIOLOGY | Facility: CLINIC | Age: 75
Discharge: HOME | End: 2025-08-22
Payer: MEDICARE

## 2025-08-22 VITALS
RESPIRATION RATE: 18 BRPM | OXYGEN SATURATION: 95 % | WEIGHT: 164 LBS | HEART RATE: 80 BPM | SYSTOLIC BLOOD PRESSURE: 140 MMHG | TEMPERATURE: 98 F | HEIGHT: 60 IN | BODY MASS INDEX: 32.2 KG/M2 | DIASTOLIC BLOOD PRESSURE: 85 MMHG

## 2025-08-22 VITALS
SYSTOLIC BLOOD PRESSURE: 166 MMHG | OXYGEN SATURATION: 97 % | TEMPERATURE: 97.5 F | HEART RATE: 66 BPM | DIASTOLIC BLOOD PRESSURE: 88 MMHG | RESPIRATION RATE: 16 BRPM

## 2025-08-22 DIAGNOSIS — K59.00 CONSTIPATION, UNSPECIFIED CONSTIPATION TYPE: ICD-10-CM

## 2025-08-22 DIAGNOSIS — K52.9 ENTEROCOLITIS: Primary | ICD-10-CM

## 2025-08-22 DIAGNOSIS — K56.609 SMALL BOWEL OBSTRUCTION (MULTI): ICD-10-CM

## 2025-08-22 DIAGNOSIS — R03.0 ELEVATED BLOOD PRESSURE READING: ICD-10-CM

## 2025-08-22 DIAGNOSIS — Z87.19 HISTORY OF CONSTIPATION: ICD-10-CM

## 2025-08-22 DIAGNOSIS — R11.0 NAUSEA: ICD-10-CM

## 2025-08-22 DIAGNOSIS — R10.84 GENERALIZED ABDOMINAL PAIN: ICD-10-CM

## 2025-08-22 DIAGNOSIS — K59.00 CONSTIPATION, UNSPECIFIED CONSTIPATION TYPE: Primary | ICD-10-CM

## 2025-08-22 DIAGNOSIS — K42.9 REDUCIBLE UMBILICAL HERNIA: ICD-10-CM

## 2025-08-22 DIAGNOSIS — R11.2 NAUSEA AND VOMITING, UNSPECIFIED VOMITING TYPE: ICD-10-CM

## 2025-08-22 LAB
ALBUMIN SERPL BCP-MCNC: 4.4 G/DL (ref 3.4–5)
ALP SERPL-CCNC: 67 U/L (ref 33–136)
ALT SERPL W P-5'-P-CCNC: 28 U/L (ref 7–45)
ANION GAP SERPL CALC-SCNC: 13 MMOL/L (ref 10–20)
APPEARANCE UR: CLEAR
AST SERPL W P-5'-P-CCNC: 23 U/L (ref 9–39)
BASOPHILS # BLD AUTO: 0.01 X10*3/UL (ref 0–0.1)
BASOPHILS NFR BLD AUTO: 0.1 %
BILIRUB DIRECT SERPL-MCNC: 0.1 MG/DL (ref 0–0.3)
BILIRUB SERPL-MCNC: 0.9 MG/DL (ref 0–1.2)
BILIRUB UR STRIP.AUTO-MCNC: NEGATIVE MG/DL
BUN SERPL-MCNC: 10 MG/DL (ref 6–23)
CALCIUM SERPL-MCNC: 9.5 MG/DL (ref 8.6–10.3)
CHLORIDE SERPL-SCNC: 104 MMOL/L (ref 98–107)
CO2 SERPL-SCNC: 25 MMOL/L (ref 21–32)
COLOR UR: YELLOW
CREAT SERPL-MCNC: 0.7 MG/DL (ref 0.5–1.05)
EGFRCR SERPLBLD CKD-EPI 2021: >90 ML/MIN/1.73M*2
EOSINOPHIL # BLD AUTO: 0 X10*3/UL (ref 0–0.4)
EOSINOPHIL NFR BLD AUTO: 0 %
ERYTHROCYTE [DISTWIDTH] IN BLOOD BY AUTOMATED COUNT: 13.1 % (ref 11.5–14.5)
GLUCOSE SERPL-MCNC: 117 MG/DL (ref 74–99)
GLUCOSE UR STRIP.AUTO-MCNC: NORMAL MG/DL
HCT VFR BLD AUTO: 43 % (ref 36–46)
HGB BLD-MCNC: 14.3 G/DL (ref 12–16)
IMM GRANULOCYTES # BLD AUTO: 0.03 X10*3/UL (ref 0–0.5)
IMM GRANULOCYTES NFR BLD AUTO: 0.4 % (ref 0–0.9)
KETONES UR STRIP.AUTO-MCNC: ABNORMAL MG/DL
LACTATE SERPL-SCNC: 0.9 MMOL/L (ref 0.4–2)
LEUKOCYTE ESTERASE UR QL STRIP.AUTO: ABNORMAL
LIPASE SERPL-CCNC: 5 U/L (ref 9–82)
LYMPHOCYTES # BLD AUTO: 0.64 X10*3/UL (ref 0.8–3)
LYMPHOCYTES NFR BLD AUTO: 8 %
MAGNESIUM SERPL-MCNC: 1.97 MG/DL (ref 1.6–2.4)
MCH RBC QN AUTO: 28.5 PG (ref 26–34)
MCHC RBC AUTO-ENTMCNC: 33.3 G/DL (ref 32–36)
MCV RBC AUTO: 86 FL (ref 80–100)
MONOCYTES # BLD AUTO: 0.31 X10*3/UL (ref 0.05–0.8)
MONOCYTES NFR BLD AUTO: 3.9 %
MUCOUS THREADS #/AREA URNS AUTO: ABNORMAL /LPF
NEUTROPHILS # BLD AUTO: 7.01 X10*3/UL (ref 1.6–5.5)
NEUTROPHILS NFR BLD AUTO: 87.6 %
NITRITE UR QL STRIP.AUTO: NEGATIVE
NRBC BLD-RTO: 0 /100 WBCS (ref 0–0)
PH UR STRIP.AUTO: 6 [PH]
PLATELET # BLD AUTO: 186 X10*3/UL (ref 150–450)
POC APPEARANCE, URINE: CLEAR
POC BILIRUBIN, URINE: NEGATIVE
POC BLOOD, URINE: ABNORMAL
POC COLOR, URINE: YELLOW
POC GLUCOSE, URINE: NEGATIVE MG/DL
POC KETONES, URINE: ABNORMAL MG/DL
POC LEUKOCYTES, URINE: NEGATIVE
POC NITRITE,URINE: NEGATIVE
POC PH, URINE: 7.5 PH
POC PROTEIN, URINE: ABNORMAL MG/DL
POC SPECIFIC GRAVITY, URINE: 1.02
POC UROBILINOGEN, URINE: 0.2 EU/DL
POTASSIUM SERPL-SCNC: 3.6 MMOL/L (ref 3.5–5.3)
PROT SERPL-MCNC: 7.3 G/DL (ref 6.4–8.2)
PROT UR STRIP.AUTO-MCNC: ABNORMAL MG/DL
RBC # BLD AUTO: 5.01 X10*6/UL (ref 4–5.2)
RBC # UR STRIP.AUTO: ABNORMAL MG/DL
RBC #/AREA URNS AUTO: ABNORMAL /HPF
SODIUM SERPL-SCNC: 138 MMOL/L (ref 136–145)
SP GR UR STRIP.AUTO: 1.02
SQUAMOUS #/AREA URNS AUTO: ABNORMAL /HPF
UROBILINOGEN UR STRIP.AUTO-MCNC: NORMAL MG/DL
WBC # BLD AUTO: 8 X10*3/UL (ref 4.4–11.3)
WBC #/AREA URNS AUTO: ABNORMAL /HPF

## 2025-08-22 PROCEDURE — 1036F TOBACCO NON-USER: CPT | Performed by: FAMILY MEDICINE

## 2025-08-22 PROCEDURE — 83735 ASSAY OF MAGNESIUM: CPT

## 2025-08-22 PROCEDURE — 99215 OFFICE O/P EST HI 40 MIN: CPT | Performed by: FAMILY MEDICINE

## 2025-08-22 PROCEDURE — 83690 ASSAY OF LIPASE: CPT

## 2025-08-22 PROCEDURE — 3077F SYST BP >= 140 MM HG: CPT | Performed by: FAMILY MEDICINE

## 2025-08-22 PROCEDURE — 81001 URINALYSIS AUTO W/SCOPE: CPT

## 2025-08-22 PROCEDURE — 2550000001 HC RX 255 CONTRASTS

## 2025-08-22 PROCEDURE — 80048 BASIC METABOLIC PNL TOTAL CA: CPT

## 2025-08-22 PROCEDURE — 82248 BILIRUBIN DIRECT: CPT

## 2025-08-22 PROCEDURE — 74019 RADEX ABDOMEN 2 VIEWS: CPT

## 2025-08-22 PROCEDURE — 3079F DIAST BP 80-89 MM HG: CPT | Performed by: FAMILY MEDICINE

## 2025-08-22 PROCEDURE — 36415 COLL VENOUS BLD VENIPUNCTURE: CPT

## 2025-08-22 PROCEDURE — 74177 CT ABD & PELVIS W/CONTRAST: CPT

## 2025-08-22 PROCEDURE — 1160F RVW MEDS BY RX/DR IN RCRD: CPT | Performed by: FAMILY MEDICINE

## 2025-08-22 PROCEDURE — 81003 URINALYSIS AUTO W/O SCOPE: CPT | Performed by: FAMILY MEDICINE

## 2025-08-22 PROCEDURE — 83605 ASSAY OF LACTIC ACID: CPT

## 2025-08-22 PROCEDURE — 87086 URINE CULTURE/COLONY COUNT: CPT | Mod: AHULAB

## 2025-08-22 PROCEDURE — 85025 COMPLETE CBC W/AUTO DIFF WBC: CPT

## 2025-08-22 PROCEDURE — 1159F MED LIST DOCD IN RCRD: CPT | Performed by: FAMILY MEDICINE

## 2025-08-22 PROCEDURE — 99285 EMERGENCY DEPT VISIT HI MDM: CPT | Mod: 25 | Performed by: EMERGENCY MEDICINE

## 2025-08-22 PROCEDURE — 74177 CT ABD & PELVIS W/CONTRAST: CPT | Performed by: STUDENT IN AN ORGANIZED HEALTH CARE EDUCATION/TRAINING PROGRAM

## 2025-08-22 RX ADMIN — IOHEXOL 75 ML: 350 INJECTION, SOLUTION INTRAVENOUS at 15:55

## 2025-08-22 ASSESSMENT — PAIN SCALES - GENERAL: PAINLEVEL_OUTOF10: 0 - NO PAIN

## 2025-08-22 ASSESSMENT — PAIN DESCRIPTION - PROGRESSION: CLINICAL_PROGRESSION: NOT CHANGED

## 2025-08-22 ASSESSMENT — PAIN - FUNCTIONAL ASSESSMENT: PAIN_FUNCTIONAL_ASSESSMENT: 0-10

## 2025-08-24 LAB — BACTERIA UR CULT: ABNORMAL

## 2025-08-25 ENCOUNTER — APPOINTMENT (OUTPATIENT)
Dept: PRIMARY CARE | Facility: CLINIC | Age: 75
End: 2025-08-25
Payer: MEDICARE

## 2025-08-25 VITALS
TEMPERATURE: 98.4 F | HEIGHT: 60 IN | WEIGHT: 164 LBS | HEART RATE: 80 BPM | SYSTOLIC BLOOD PRESSURE: 132 MMHG | BODY MASS INDEX: 32.2 KG/M2 | DIASTOLIC BLOOD PRESSURE: 84 MMHG

## 2025-08-25 DIAGNOSIS — Z00.00 MEDICARE ANNUAL WELLNESS VISIT, SUBSEQUENT: Primary | ICD-10-CM

## 2025-08-25 DIAGNOSIS — I10 PRIMARY HYPERTENSION: ICD-10-CM

## 2025-08-25 DIAGNOSIS — M35.05 SJOGREN'S SYNDROME WITH INFLAMMATORY ARTHRITIS: ICD-10-CM

## 2025-08-25 DIAGNOSIS — N30.00 ACUTE CYSTITIS WITHOUT HEMATURIA: ICD-10-CM

## 2025-08-25 DIAGNOSIS — E78.00 PURE HYPERCHOLESTEROLEMIA: ICD-10-CM

## 2025-08-25 DIAGNOSIS — B02.8 HERPES ZOSTER WITH OTHER COMPLICATION: ICD-10-CM

## 2025-08-25 DIAGNOSIS — R73.09 ELEVATED GLUCOSE: ICD-10-CM

## 2025-08-25 DIAGNOSIS — F41.8 DEPRESSION WITH ANXIETY: ICD-10-CM

## 2025-08-25 DIAGNOSIS — E03.8 OTHER SPECIFIED HYPOTHYROIDISM: ICD-10-CM

## 2025-08-25 LAB
BACTERIA UR CULT: ABNORMAL
HOLD SPECIMEN: NORMAL

## 2025-08-25 PROCEDURE — 1170F FXNL STATUS ASSESSED: CPT | Performed by: INTERNAL MEDICINE

## 2025-08-25 PROCEDURE — 1158F ADVNC CARE PLAN TLK DOCD: CPT | Performed by: INTERNAL MEDICINE

## 2025-08-25 PROCEDURE — 99215 OFFICE O/P EST HI 40 MIN: CPT | Performed by: INTERNAL MEDICINE

## 2025-08-25 PROCEDURE — G0439 PPPS, SUBSEQ VISIT: HCPCS | Performed by: INTERNAL MEDICINE

## 2025-08-25 PROCEDURE — 1036F TOBACCO NON-USER: CPT | Performed by: INTERNAL MEDICINE

## 2025-08-25 PROCEDURE — 1159F MED LIST DOCD IN RCRD: CPT | Performed by: INTERNAL MEDICINE

## 2025-08-25 PROCEDURE — 3079F DIAST BP 80-89 MM HG: CPT | Performed by: INTERNAL MEDICINE

## 2025-08-25 PROCEDURE — 1123F ACP DISCUSS/DSCN MKR DOCD: CPT | Performed by: INTERNAL MEDICINE

## 2025-08-25 PROCEDURE — 3075F SYST BP GE 130 - 139MM HG: CPT | Performed by: INTERNAL MEDICINE

## 2025-08-25 PROCEDURE — 3008F BODY MASS INDEX DOCD: CPT | Performed by: INTERNAL MEDICINE

## 2025-08-25 PROCEDURE — 1160F RVW MEDS BY RX/DR IN RCRD: CPT | Performed by: INTERNAL MEDICINE

## 2025-08-25 RX ORDER — AMLODIPINE BESYLATE 5 MG/1
5 TABLET ORAL DAILY
Qty: 90 TABLET | Refills: 1 | Status: SHIPPED | OUTPATIENT
Start: 2025-08-25

## 2025-08-25 RX ORDER — NITROFURANTOIN 25; 75 MG/1; MG/1
100 CAPSULE ORAL 2 TIMES DAILY
Qty: 20 CAPSULE | Refills: 0 | Status: SHIPPED | OUTPATIENT
Start: 2025-08-25 | End: 2025-08-30

## 2025-08-25 RX ORDER — VALACYCLOVIR HYDROCHLORIDE 1 G/1
1000 TABLET, FILM COATED ORAL 2 TIMES DAILY
Qty: 14 TABLET | Refills: 1 | Status: SHIPPED | OUTPATIENT
Start: 2025-08-25 | End: 2025-09-04

## 2025-08-25 ASSESSMENT — ENCOUNTER SYMPTOMS
DYSURIA: 0
ANOREXIA: 1
NERVOUS/ANXIOUS: 0
NAUSEA: 1
CHILLS: 0
ABDOMINAL PAIN: 1
BELCHING: 0
HEMATURIA: 0
BLOOD IN STOOL: 0
HEMATOCHEZIA: 0
WEAKNESS: 0
PALPITATIONS: 0
HEADACHES: 0
BACK PAIN: 0
DIZZINESS: 0
CONFUSION: 0
NECK PAIN: 0
COUGH: 0
OCCASIONAL FEELINGS OF UNSTEADINESS: 0
FEVER: 0
WEIGHT LOSS: 0
VOMITING: 0
SHORTNESS OF BREATH: 0
SINUS PAIN: 0
CONSTIPATION: 1
SORE THROAT: 0
DEPRESSION: 0
ARTHRALGIAS: 1
FATIGUE: 0
FLATUS: 0
DIARRHEA: 0
MYALGIAS: 1
LOSS OF SENSATION IN FEET: 0
FREQUENCY: 0

## 2025-08-25 ASSESSMENT — ACTIVITIES OF DAILY LIVING (ADL)
DOING_HOUSEWORK: INDEPENDENT
TAKING_MEDICATION: INDEPENDENT
GROCERY_SHOPPING: INDEPENDENT
MANAGING_FINANCES: INDEPENDENT
DRESSING: INDEPENDENT
BATHING: INDEPENDENT

## 2025-08-25 ASSESSMENT — PATIENT HEALTH QUESTIONNAIRE - PHQ9
2. FEELING DOWN, DEPRESSED OR HOPELESS: NOT AT ALL
SUM OF ALL RESPONSES TO PHQ9 QUESTIONS 1 AND 2: 0
1. LITTLE INTEREST OR PLEASURE IN DOING THINGS: NOT AT ALL

## 2025-08-26 ENCOUNTER — RESULTS FOLLOW-UP (OUTPATIENT)
Dept: PHARMACY | Facility: HOSPITAL | Age: 75
End: 2025-08-26
Payer: MEDICARE

## 2025-09-04 ENCOUNTER — RESULTS FOLLOW-UP (OUTPATIENT)
Dept: PRIMARY CARE | Facility: CLINIC | Age: 75
End: 2025-09-04
Payer: MEDICARE

## 2025-09-04 LAB
CHOLEST SERPL-MCNC: 208 MG/DL
CHOLEST/HDLC SERPL: 3 (CALC)
EST. AVERAGE GLUCOSE BLD GHB EST-MCNC: 117 MG/DL
EST. AVERAGE GLUCOSE BLD GHB EST-SCNC: 6.5 MMOL/L
HBA1C MFR BLD: 5.7 %
HDLC SERPL-MCNC: 69 MG/DL
LDLC SERPL CALC-MCNC: 118 MG/DL (CALC)
NONHDLC SERPL-MCNC: 139 MG/DL (CALC)
T4 FREE SERPL-MCNC: 1.8 NG/DL (ref 0.8–1.8)
TRIGL SERPL-MCNC: 99 MG/DL
TSH SERPL-ACNC: 2.88 MIU/L (ref 0.4–4.5)

## 2025-09-17 ENCOUNTER — APPOINTMENT (OUTPATIENT)
Dept: ENDOCRINOLOGY | Facility: CLINIC | Age: 75
End: 2025-09-17
Payer: MEDICARE

## 2025-09-30 ENCOUNTER — APPOINTMENT (OUTPATIENT)
Dept: ENDOCRINOLOGY | Facility: CLINIC | Age: 75
End: 2025-09-30
Payer: MEDICARE

## 2025-10-27 ENCOUNTER — APPOINTMENT (OUTPATIENT)
Dept: PRIMARY CARE | Facility: CLINIC | Age: 75
End: 2025-10-27
Payer: MEDICARE